# Patient Record
Sex: FEMALE
[De-identification: names, ages, dates, MRNs, and addresses within clinical notes are randomized per-mention and may not be internally consistent; named-entity substitution may affect disease eponyms.]

---

## 2021-03-15 ENCOUNTER — HOSPITAL ENCOUNTER (EMERGENCY)
Dept: HOSPITAL 5 - ED | Age: 56
Discharge: HOME | End: 2021-03-15
Payer: SELF-PAY

## 2021-03-15 VITALS — SYSTOLIC BLOOD PRESSURE: 164 MMHG | DIASTOLIC BLOOD PRESSURE: 90 MMHG

## 2021-03-15 DIAGNOSIS — Z91.048: ICD-10-CM

## 2021-03-15 DIAGNOSIS — Z91.040: ICD-10-CM

## 2021-03-15 DIAGNOSIS — Z79.899: ICD-10-CM

## 2021-03-15 DIAGNOSIS — R10.13: ICD-10-CM

## 2021-03-15 DIAGNOSIS — Z90.49: ICD-10-CM

## 2021-03-15 DIAGNOSIS — R07.9: Primary | ICD-10-CM

## 2021-03-15 DIAGNOSIS — Z98.890: ICD-10-CM

## 2021-03-15 LAB
ALBUMIN SERPL-MCNC: 4.1 G/DL (ref 3.9–5)
ALT SERPL-CCNC: 27 UNITS/L (ref 7–56)
APTT BLD: 29.6 SEC. (ref 24.2–36.6)
BASOPHILS # (AUTO): 0 K/MM3 (ref 0–0.1)
BASOPHILS NFR BLD AUTO: 0.3 % (ref 0–1.8)
BUN SERPL-MCNC: 17 MG/DL (ref 7–17)
BUN/CREAT SERPL: 28 %
CALCIUM SERPL-MCNC: 9.3 MG/DL (ref 8.4–10.2)
EOSINOPHIL # BLD AUTO: 0 K/MM3 (ref 0–0.4)
EOSINOPHIL NFR BLD AUTO: 0.3 % (ref 0–4.3)
HCT VFR BLD CALC: 43.1 % (ref 30.3–42.9)
HEMOLYSIS INDEX: 4
HGB BLD-MCNC: 15.1 GM/DL (ref 10.1–14.3)
INR PPP: 0.97 (ref 0.87–1.13)
LYMPHOCYTES # BLD AUTO: 0.9 K/MM3 (ref 1.2–5.4)
LYMPHOCYTES NFR BLD AUTO: 14.5 % (ref 13.4–35)
MCHC RBC AUTO-ENTMCNC: 35 % (ref 30–34)
MCV RBC AUTO: 89 FL (ref 79–97)
MONOCYTES # (AUTO): 0.3 K/MM3 (ref 0–0.8)
MONOCYTES % (AUTO): 4.1 % (ref 0–7.3)
PLATELET # BLD: 242 K/MM3 (ref 140–440)
RBC # BLD AUTO: 4.86 M/MM3 (ref 3.65–5.03)

## 2021-03-15 PROCEDURE — 83880 ASSAY OF NATRIURETIC PEPTIDE: CPT

## 2021-03-15 PROCEDURE — 80053 COMPREHEN METABOLIC PANEL: CPT

## 2021-03-15 PROCEDURE — C9113 INJ PANTOPRAZOLE SODIUM, VIA: HCPCS

## 2021-03-15 PROCEDURE — 96374 THER/PROPH/DIAG INJ IV PUSH: CPT

## 2021-03-15 PROCEDURE — 85025 COMPLETE CBC W/AUTO DIFF WBC: CPT

## 2021-03-15 PROCEDURE — 96375 TX/PRO/DX INJ NEW DRUG ADDON: CPT

## 2021-03-15 PROCEDURE — 85610 PROTHROMBIN TIME: CPT

## 2021-03-15 PROCEDURE — 93005 ELECTROCARDIOGRAM TRACING: CPT

## 2021-03-15 PROCEDURE — 83690 ASSAY OF LIPASE: CPT

## 2021-03-15 PROCEDURE — 99284 EMERGENCY DEPT VISIT MOD MDM: CPT

## 2021-03-15 PROCEDURE — 71046 X-RAY EXAM CHEST 2 VIEWS: CPT

## 2021-03-15 PROCEDURE — 74177 CT ABD & PELVIS W/CONTRAST: CPT

## 2021-03-15 PROCEDURE — 36415 COLL VENOUS BLD VENIPUNCTURE: CPT

## 2021-03-15 PROCEDURE — 85730 THROMBOPLASTIN TIME PARTIAL: CPT

## 2021-03-15 PROCEDURE — 84484 ASSAY OF TROPONIN QUANT: CPT

## 2021-03-15 NOTE — EVENT NOTE
ED Screening Note


Date of service: 03/15/21


Time: 12:15


ED Screening Note: 


Patient with substernal chest pain radiating to the back.  Onset this morning.  

She reports nausea but no vomiting.  She denies any other symptoms.  She denies 

any heart disease.  She denies any lung disease.  She denies any other 

significant past history.





This initial assessment/diagnostic orders/clinical plan/treatment(s) is/are 

subject to change based on patients health status, clinical progression and 

re-assessment by fellow clinical providers in the ED. Further treatment and 

workup at subsequent clinical providers discretion. Patient/guardian urged not 

to elope from the ED as their condition may be serious if not clinically 

assessed and managed. 





Initial orders include: 


Chest pain order set

## 2021-03-15 NOTE — EMERGENCY DEPARTMENT REPORT
ED Chest Pain HPI





- General


Chief Complaint: Chest Pain


Stated Complaint: CHEST TO BACK PAINS


Time Seen by Provider: 03/15/21 12:13


Source: patient


Mode of arrival: Ambulatory


Limitations: No Limitations





- History of Present Illness


Initial Comments: 





Patient is 55 years old female with no significant past medical history except 

for kidney stone.  Patient presented to the ER complaining of epigastric pain 

with no radiation.  Patient stated that pain started this morning.  Patient 

stated that the pain associated with nausea but no vomiting.  Patient denied any

fever or chills.  No shortness of breath.


MD Complaint: chest pain


-: This morning


Onset: during rest


Pain Location: substernal


Pain Radiation: none


Severity: moderate


Severity scale (0 -10): 5


Quality: sharp


Consistency: intermittent





- Related Data


                                    Allergies











Allergy/AdvReac Type Severity Reaction Status Date / Time


 


Latex, Natural Rubber Allergy  Itching Verified 03/15/21 11:50














Heart Score





- HEART Score


History: Slightly suspicious


EKG: Non-specific


Age: 45-65


Risk factors: 1-2 risk factors


Troponin: < normal limit


HEART Score: 3





- Critical Actions


Critical Actions: 0-3 pts:0.9-1.7%risk of adverse cardiac event.Candidate for 

discharge





ED Review of Systems


ROS: 


Stated complaint: CHEST TO BACK PAINS


Other details as noted in HPI





Comment: All other systems reviewed and negative


Constitutional: denies: chills, fever


Respiratory: denies: cough, shortness of breath, SOB with exertion


Cardiovascular: chest pain.  denies: palpitations, dyspnea on exertion


Gastrointestinal: abdominal pain, nausea.  denies: vomiting, diarrhea, 

constipation, hematemesis, melena, hematochezia


Musculoskeletal: denies: back pain


Neurological: denies: headache, weakness





ED Past Medical Hx





- Past Medical History


Previous Medical History?: No





- Surgical History


Past Surgical History?: Yes


Hx Cholecystectomy: Yes


Additional Surgical History: T&A.  kidney surgery





- Social History


Smoking Status: Never Smoker


Substance Use Type: None





ED Physical Exam





- General


Limitations: No Limitations


General appearance: alert, in no apparent distress





- Head


Head exam: Present: atraumatic, normocephalic, normal inspection





- Eye


Eye exam: Present: normal appearance, PERRL





- ENT


ENT exam: Present: normal exam, normal orophraynx, mucous membranes moist





- Neck


Neck exam: Present: normal inspection, full ROM.  Absent: tenderness, 

meningismus





- Respiratory


Respiratory exam: Present: normal lung sounds bilaterally





- Cardiovascular


Cardiovascular Exam: Present: regular rate, normal rhythm, normal heart sounds





- GI/Abdominal


GI/Abdominal exam: Present: soft, normal bowel sounds.  Absent: distended, 

tenderness, guarding, rebound, rigid, organomegaly, mass, bruit, pulsatile mass





- Extremities Exam


Extremities exam: Present: normal inspection, full ROM, normal capillary refill.

 Absent: tenderness





- Back Exam


Back exam: Present: normal inspection, full ROM.  Absent: CVA tenderness (R), 

CVA tenderness (L)





- Neurological Exam


Neurological exam: Present: alert, oriented X3, CN II-XII intact, normal gait, 

reflexes normal.  Absent: motor sensory deficit





- Psychiatric


Psychiatric exam: Present: normal mood





- Skin


Skin exam: Present: warm, intact, normal color





ED Course


                                   Vital Signs











  03/15/21 03/15/21





  11:50 12:59


 


Temperature 98.0 F 98.2 F


 


Pulse Rate 118 H 111 H


 


Respiratory 18 18





Rate  


 


Blood Pressure 163/105 


 


Blood Pressure  164/90





[Right]  


 


O2 Sat by Pulse 99 100





Oximetry  














ED Medical Decision Making





- Lab Data


Result diagrams: 


                                 03/15/21 12:15





                                 03/15/21 12:15





- EKG Data


-: EKG Interpreted by Me


EKG shows normal: sinus rhythm


Rate: tachycardia





- EKG Data


Interpretation: no acute changes





- Radiology Data


Radiology results: report reviewed





- Medical Decision Making





Patient is 55 years old female with no significant past medical history except 

for kidney stone.  Patient presented to the ER complaining of epigastric pain 

with no radiation.  Patient stated that pain started this morning.  Patient 

stated that the pain associated with nausea but no vomiting.  Patient denied any

 fever or chills.  No shortness of breath.


EKG shows sinus tachycardia.  Patient stated that she is usually anxious.  Labs 

reviewed and is unremarkable.  CT abdomen and pelvis with IV contrast is negati

ve for acute finding.  Troponin is negative x2.  Patient chest pain is atypical 

however patient strongly advised to follow-up with her primary care physician 

for outpatient cardiac work-up.  Patient also advised to return to the ER if she

 develop any new symptoms.


Critical care attestation.: 


If time is entered above; I have spent that time in minutes in the direct care 

of this critically ill patient, excluding procedure time.








ED Disposition


Clinical Impression: 


 Acute chest pain, Acute abdominal pain





Disposition: DC-01 TO HOME OR SELFCARE


Is pt being admited?: No


Condition: Stable


Instructions:  Chest Pain (ED), Nonspecific Chest Pain, Adult, Abdominal Pain, 

Adult


Referrals: 


[facility] [Other] - 3-5 Days

## 2021-03-15 NOTE — CAT SCAN REPORT
CT ABDOMEN AND PELVIS WITH CONTRAST



INDICATION / CLINICAL INFORMATION: Abdominal pain.



TECHNIQUE: Axial CT images were obtained through the abdomen and pelvis after Omnipaque 300, 100 cc I
V contrast.  All CT scans at this location are performed using CT dose reduction for ALARA by means o
f automated exposure control. 



COMPARISON: None available.



FINDINGS:



LOWER CHEST: No significant abnormality.

LIVER: Small low-density lesions.

GALLBLADDER: Surgically absent.  

BILE DUCTS: No significant abnormality.

PANCREAS: No significant abnormality.

SPLEEN: No significant abnormality.

ADRENALS: No significant abnormality.

RIGHT KIDNEY / URETER: 1.2 cm cyst upper pole.

LEFT KIDNEY / URETER: 1.1 cm cyst upper pole. 2 nonobstructing stones measuring no larger than 3 mm.



STOMACH / SMALL BOWEL: No significant abnormality. 

COLON: Scattered noninflamed diverticulosis is seen diffusely. 

APPENDIX: No significant abnormality.  

PERITONEUM: No free fluid. No free air. No fluid collection.

LYMPH NODES: No significant adenopathy.

VASCULAR STRUCTURES: No significant abnormality. 



URINARY BLADDER: No significant abnormality.

REPRODUCTIVE ORGANS: No significant abnormality.



ADDITIONAL FINDINGS: None.



SKELETAL SYSTEM: No significant abnormality.



IMPRESSION:

1. No abdominal inflammatory process.

2. Small low-density liver lesions aren't not well characterized but likely incidental in the absence
 of known malignancy.

3. Small nonobstructing left renal stones.

4. Noninflamed colonic diverticulosis.



Signer Name: Bright Alvarez MD 

Signed: 3/15/2021 2:20 PM

Workstation Name: [id number]

## 2021-03-15 NOTE — XRAY REPORT
CHEST 2 VIEWS 



INDICATION / CLINICAL INFORMATION:

Chest Pain.



COMPARISON: 

None available.



FINDINGS:



SUPPORT DEVICES: None.

HEART / MEDIASTINUM: No significant abnormality. 

LUNGS / PLEURA: No significant pulmonary or pleural abnormality. No pneumothorax. 



ADDITIONAL FINDINGS: No significant additional findings.



IMPRESSION:

1. No acute findings.



Signer Name: Josue Hernandez MD 

Signed: 3/15/2021 12:41 PM

Workstation Name: [email or web address]

## 2021-05-02 ENCOUNTER — HOSPITAL ENCOUNTER (OUTPATIENT)
Dept: HOSPITAL 5 - ED | Age: 56
Setting detail: OBSERVATION
LOS: 1 days | Discharge: HOME | End: 2021-05-03
Attending: INTERNAL MEDICINE | Admitting: INTERNAL MEDICINE
Payer: SELF-PAY

## 2021-05-02 DIAGNOSIS — R94.31: ICD-10-CM

## 2021-05-02 DIAGNOSIS — Z98.890: ICD-10-CM

## 2021-05-02 DIAGNOSIS — I10: ICD-10-CM

## 2021-05-02 DIAGNOSIS — R07.89: Primary | ICD-10-CM

## 2021-05-02 DIAGNOSIS — Z90.49: ICD-10-CM

## 2021-05-02 LAB
ALBUMIN SERPL-MCNC: 3.9 G/DL (ref 3.9–5)
ALT SERPL-CCNC: 21 UNITS/L (ref 7–56)
APTT BLD: 29.2 SEC. (ref 24.2–36.6)
BASOPHILS # (AUTO): 0 K/MM3 (ref 0–0.1)
BASOPHILS NFR BLD AUTO: 0.4 % (ref 0–1.8)
BUN SERPL-MCNC: 15 MG/DL (ref 7–17)
BUN/CREAT SERPL: 25 %
CALCIUM SERPL-MCNC: 9.1 MG/DL (ref 8.4–10.2)
EOSINOPHIL # BLD AUTO: 0 K/MM3 (ref 0–0.4)
EOSINOPHIL NFR BLD AUTO: 0.2 % (ref 0–4.3)
HCT VFR BLD CALC: 44.4 % (ref 30.3–42.9)
HEMOLYSIS INDEX: 27
HGB BLD-MCNC: 15.2 GM/DL (ref 10.1–14.3)
INR PPP: 1 (ref 0.87–1.13)
LYMPHOCYTES # BLD AUTO: 1 K/MM3 (ref 1.2–5.4)
LYMPHOCYTES NFR BLD AUTO: 12.8 % (ref 13.4–35)
MCHC RBC AUTO-ENTMCNC: 34 % (ref 30–34)
MCV RBC AUTO: 91 FL (ref 79–97)
MONOCYTES # (AUTO): 0.4 K/MM3 (ref 0–0.8)
MONOCYTES % (AUTO): 5.5 % (ref 0–7.3)
PLATELET # BLD: 256 K/MM3 (ref 140–440)
RBC # BLD AUTO: 4.88 M/MM3 (ref 3.65–5.03)

## 2021-05-02 PROCEDURE — 36415 COLL VENOUS BLD VENIPUNCTURE: CPT

## 2021-05-02 PROCEDURE — 85379 FIBRIN DEGRADATION QUANT: CPT

## 2021-05-02 PROCEDURE — 85730 THROMBOPLASTIN TIME PARTIAL: CPT

## 2021-05-02 PROCEDURE — 83735 ASSAY OF MAGNESIUM: CPT

## 2021-05-02 PROCEDURE — 96372 THER/PROPH/DIAG INJ SC/IM: CPT

## 2021-05-02 PROCEDURE — 85025 COMPLETE CBC W/AUTO DIFF WBC: CPT

## 2021-05-02 PROCEDURE — G0378 HOSPITAL OBSERVATION PER HR: HCPCS

## 2021-05-02 PROCEDURE — 80061 LIPID PANEL: CPT

## 2021-05-02 PROCEDURE — 80053 COMPREHEN METABOLIC PANEL: CPT

## 2021-05-02 PROCEDURE — 84484 ASSAY OF TROPONIN QUANT: CPT

## 2021-05-02 PROCEDURE — 99285 EMERGENCY DEPT VISIT HI MDM: CPT

## 2021-05-02 PROCEDURE — 80048 BASIC METABOLIC PNL TOTAL CA: CPT

## 2021-05-02 PROCEDURE — 71046 X-RAY EXAM CHEST 2 VIEWS: CPT

## 2021-05-02 PROCEDURE — 85610 PROTHROMBIN TIME: CPT

## 2021-05-02 PROCEDURE — 93306 TTE W/DOPPLER COMPLETE: CPT

## 2021-05-02 PROCEDURE — 93005 ELECTROCARDIOGRAM TRACING: CPT

## 2021-05-02 RX ADMIN — ASPIRIN ONE: 325 TABLET ORAL at 22:35

## 2021-05-02 RX ADMIN — ASPIRIN ONE MG: 325 TABLET ORAL at 22:30

## 2021-05-02 NOTE — XRAY REPORT
CHEST PA AND LATERAL VIEWS



INDICATION: 

chest pain. 



COMPARISON: 

3/15/2021



FINDINGS:



Support devices: None 



Heart: Normal and unchanged 



Lungs/Pleura: No acute pulmonary or pleural findings.  





IMPRESSION:

1. No acute disease and no interval change.



Signer Name: Landry Wagoner MD 

Signed: 5/2/2021 5:53 PM

Workstation Name: [id number]

## 2021-05-02 NOTE — HISTORY AND PHYSICAL REPORT
History of Present Illness


Date of examination: 05/02/21


Date of admission: 


05/02/2021


Chief complaint: 





Chest Pain


History of present illness: 





55-year-old white female with no significant past medical history presenting in 

the emergency room today complaining of chest pain has been ongoing for the past

3 days.  Chest pain has been intermittent and has been going from the right side

of her chest to the left side of her chest.  She has had associated nausea but 

no vomiting.  Denies any headache or dizziness.  Denies any diaphoresis.  She 

denies any cough or shortness of breath.





There is no no relieving or exacerbating factor for chest pain.  There has been 

no radiation.


Patient had a normal stress test in 2017.  No history of cardiac 

catheterization.





Work-up in the emergency room today, chest x-ray, troponin levels were 

unremarkable.  She however has some ST depression in the inferolateral leads.





Patient is being admitted for chest pain evaluation.





Past History


Past Surgical History: cholecystectomy, Other (T&A,Bladder surgery for a 

lesion.)


Social history: no significant social history


Family history: no significant family history





Medications and Allergies


                                    Allergies











Allergy/AdvReac Type Severity Reaction Status Date / Time


 


Latex, Natural Rubber Allergy  Itching Verified 05/02/21 17:08











                                Home Medications











 Medication  Instructions  Recorded  Confirmed  Last Taken  Type


 


Ondansetron [Zofran Odt] 4 mg PO Q8HR PRN #14 tab.rapdis 03/15/21  Unknown Rx


 


Pantoprazole [Protonix] 40 mg PO QDAY #30 tablet 03/15/21  Unknown Rx


 


traMADoL [Ultram 50 MG tab] 50 mg PO Q4HR PRN #14 tablet 03/15/21  Unknown Rx














Review of Systems


Constitutional: no fever, no chills


Ears, nose, mouth and throat: no nasal congestion, no sore throat


Cardiovascular: chest pain, no palpitations, no syncope


Respiratory: no cough, no shortness of breath


Gastrointestinal: no abdominal pain, no nausea, no vomiting, no diarrhea


Genitourinary Female: no pelvic pain, no flank pain, no dysuria, no hematuria


Musculoskeletal: no neck pain, no low back pain


Integumentary: no rash, no pruritis


Neurological: no headaches, no confusion


Psychiatric: no anxiety, no depression


Endocrine: no polyphagia, no polydipsia, no polyuria, no nocturia





Exam





- Constitutional


Vitals: 


                                        











Temp Pulse Resp BP Pulse Ox


 


 99.2 F   113 H  20   118/88   93 


 


 05/02/21 17:43  05/02/21 17:43  05/02/21 17:43  05/02/21 17:43  05/02/21 17:43











General appearance: Present: no acute distress, well-nourished





- EENT


Eyes: Present: PERRL, EOM intact.  Absent: scleral icterus


ENT: hearing intact, clear oral mucosa, dentition normal





- Neck


Neck: Present: supple, normal ROM





- Respiratory


Respiratory effort: normal


Respiratory: bilateral: CTA





- Cardiovascular


Rhythm: regular


Heart Sounds: Present: S1 & S2.  Absent: gallop, systolic murmur, diastolic 

murmur, rub, click





- Extremities


Extremities: no ischemia, pulses intact, pulses symmetrical, No edema, normal 

temperature, normal color, Full ROM


Peripheral Pulses: within normal limits





- Abdominal


General gastrointestinal: Present: soft, non-tender, non-distended, normal bowel

sounds.  Absent: mass





- Integumentary


Integumentary: Present: clear, warm, dry.  Absent: rash





- Musculoskeletal


Musculoskeletal: strength equal bilaterally





- Psychiatric


Psychiatric: appropriate mood/affect, intact judgment & insight, memory intact, 

cooperative





- Neurologic


Neurologic: CNII-XII intact, no focal deficits, moves all extremities





HEART Score





- HEART Score


History: Moderately suspicious


EKG: Significant ST-depression


Age: 45-65


Risk factors: No known risk factors


Troponin: 


                                        











Troponin T  < 0.010 ng/mL (0.00-0.029)   05/02/21  20:21    











Troponin: < normal limit


HEART Score: 4





Results





- Labs


CBC & Chem 7: 


                                 05/02/21 17:56





                                 05/02/21 17:56


Labs: 


                              Abnormal lab results











  05/02/21 Range/Units





  17:56 


 


Hgb  15.2 H  (10.1-14.3)  gm/dl


 


Hct  44.4 H  (30.3-42.9)  %


 


RDW  12.9 L  (13.2-15.2)  %


 


Lymph % (Auto)  12.8 L  (13.4-35.0)  %


 


Lymph # (Auto)  1.0 L  (1.2-5.4)  K/mm3


 


Seg Neutrophils %  81.1 H  (40.0-70.0)  %














Assessment and Plan





- Patient Problems


(1) Chest pain


Current Visit: Yes   Status: Acute   


Plan to address problem: 


Patient admitted and placed on telemetry.  Will monitor cardiac enzymes.


Patient placed on aspirin, sublingual nitroglycerin and IV morphine as needed 

for chest pain.


We will place consult to cardiology for evaluation.








(2) DVT prophylaxis


Current Visit: Yes   Status: Acute   


Plan to address problem: 


Patient placed on subcutaneous heparin.








(3) Full code status


Current Visit: Yes   Status: Acute   


Plan to address problem: 


Patient is full code.

## 2021-05-02 NOTE — EMERGENCY DEPARTMENT REPORT
HPI





- General


Chief Complaint: Chest Pain


Time Seen by Provider: 21 21:19





- HPI


HPI: 





Room 18








The patient is a 55-year-old female present with a chief complaint of chest 

pain.  Patient states for the past 3 days she has had intermittent right-sided 

chest pain described as burning in nature associated with nausea no vomiting.  

Patient denies shortness of breath or diaphoresis.  Patient denies history of 

cough or pleurisy.  Patient denies any recent flights or long car trips.  

Patient denies any breast lesions.  Patient states her last stress test occurred

in  but she is never had a cardiac catheterization





ED Past Medical Hx





- Past Medical History


Previous Medical History?: No





- Surgical History


Hx Cholecystectomy: Yes


Additional Surgical History: T&A.  Bladder lesions removed (benign)





- Family History


Family history: no significant





- Social History


Smoking Status: Never Smoker


Substance Use Type: None (Denies illicit drug use)





- Medications


Home Medications: 


                                Home Medications











 Medication  Instructions  Recorded  Confirmed  Last Taken  Type


 


Ondansetron [Zofran Odt] 4 mg PO Q8HR PRN #14 tab.rapdis 03/15/21  Unknown Rx


 


Pantoprazole [Protonix] 40 mg PO QDAY #30 tablet 03/15/21  Unknown Rx


 


traMADoL [Ultram 50 MG tab] 50 mg PO Q4HR PRN #14 tablet 03/15/21  Unknown Rx














ED Review of Systems


ROS: 


Stated complaint: SHARP RT SIDE BREAST PAIN


Other details as noted in HPI





Constitutional: denies: diaphoresis


Eyes: denies: eye pain


ENT: denies: throat pain


Respiratory: denies: shortness of breath


Cardiovascular: chest pain


Endocrine: no symptoms reported


Gastrointestinal: nausea.  denies: abdominal pain, vomiting


Genitourinary: denies: dysuria


Musculoskeletal: denies: back pain


Skin: denies: lesions


Neurological: denies: headache





Physical Exam





- Physical Exam


Vital Signs: 


                                   Vital Signs











  21





  17:43


 


Temperature 99.2 F


 


Pulse Rate 113 H


 


Respiratory 20





Rate 


 


Blood Pressure 118/88


 


O2 Sat by Pulse 93





Oximetry 











Physical Exam: 





GENERAL: The patient is well-developed well-nourished female lying on stretcher 

not appearing to be in acute distress. []


HEENT: Normocephalic.  Atraumatic.  Extraocular motions are intact.  Patient has

 moist mucous membranes.


NECK: Supple.  Trachea midline


CHEST/LUNGS: Clear to auscultation.  There is no respiratory distress noted.


HEART/CARDIOVASCULAR: Regular.  There is no tachycardia.  There is no gallop rub

 or murmur.


ABDOMEN: Abdomen is soft, nontender.  Patient has normal bowel sounds.  There is

 no abdominal distention.


SKIN: There is no rash.  There is no edema.  There is no diaphoresis.


NEURO: The patient is awake, alert, and oriented.  The patient is cooperative.  

The patient has no focal neurologic deficits.  The patient has normal speech


MUSCULOSKELETAL: There is mild "soreness" per patient to palpation of bilateral 

lower extremity.  There is no evidence of acute injury.





ED Course


                                   Vital Signs











  21





  17:43


 


Temperature 99.2 F


 


Pulse Rate 113 H


 


Respiratory 20





Rate 


 


Blood Pressure 118/88


 


O2 Sat by Pulse 93





Oximetry 














ED Medical Decision Making





- Lab Data


Result diagrams: 


                                 21 17:56





                                 21 17:56





                                Laboratory Tests











  21





  17:56 17:56 17:59


 


WBC  7.9  


 


RBC  4.88  


 


Hgb  15.2 H  


 


Hct  44.4 H  


 


MCV  91  


 


MCH  31  


 


MCHC  34  


 


RDW  12.9 L  


 


Plt Count  256  


 


Lymph % (Auto)  12.8 L  


 


Mono % (Auto)  5.5  


 


Eos % (Auto)  0.2  


 


Baso % (Auto)  0.4  


 


Lymph # (Auto)  1.0 L  


 


Mono # (Auto)  0.4  


 


Eos # (Auto)  0.0  


 


Baso # (Auto)  0.0  


 


Seg Neutrophils %  81.1 H  


 


Seg Neutrophils #  6.4  


 


PT    13.1


 


INR    1.00


 


APTT    29.2


 


D-Dimer   


 


Sodium   138 


 


Potassium   3.8 


 


Chloride   104.7 


 


Carbon Dioxide   23 


 


Anion Gap   14 


 


BUN   15 


 


Creatinine   0.6 


 


Estimated GFR   > 60 


 


BUN/Creatinine Ratio   25 


 


Glucose   94 


 


Calcium   9.1 


 


Magnesium   


 


Total Bilirubin   0.30 


 


AST   17 


 


ALT   21 


 


Alkaline Phosphatase   82 


 


Troponin T   < 0.010 


 


Total Protein   6.5 


 


Albumin   3.9 


 


Albumin/Globulin Ratio   1.5 














  21





  17:59 17:59 20:21


 


WBC   


 


RBC   


 


Hgb   


 


Hct   


 


MCV   


 


MCH   


 


MCHC   


 


RDW   


 


Plt Count   


 


Lymph % (Auto)   


 


Mono % (Auto)   


 


Eos % (Auto)   


 


Baso % (Auto)   


 


Lymph # (Auto)   


 


Mono # (Auto)   


 


Eos # (Auto)   


 


Baso # (Auto)   


 


Seg Neutrophils %   


 


Seg Neutrophils #   


 


PT   


 


INR   


 


APTT   


 


D-Dimer   < 135 


 


Sodium   


 


Potassium   


 


Chloride   


 


Carbon Dioxide   


 


Anion Gap   


 


BUN   


 


Creatinine   


 


Estimated GFR   


 


BUN/Creatinine Ratio   


 


Glucose   


 


Calcium   


 


Magnesium  1.70  


 


Total Bilirubin   


 


AST   


 


ALT   


 


Alkaline Phosphatase   


 


Troponin T    < 0.010


 


Total Protein   


 


Albumin   


 


Albumin/Globulin Ratio   














- EKG Data


-: EKG Interpreted by Me


EKG shows normal: sinus rhythm


Rate: tachycardia





- EKG Data


When compared to previous EKG there are: changes noted


Interpretation: subendocardial ischemia (ST depressions inferolaterally)





- Radiology Data


Radiology results: report reviewed (Chest x-ray), image reviewed (Chest x-ray)


interpreted by me: 





Chest x-ray-no focal infiltrates, no pneumothorax.  No foreign body seen





[facility] [address] [place] [address] 

XRay Report Signed Patient: ANA M MOLINA MR#: [id number] : 1965 

Acct:[id number] Age/Sex: 55 / F ADM Date: 21 Loc: ED Attending Dr: 

Ordering Physician: LOUIE SERRA MD Date of Service: 21 Procedure(s): XR chest 

routine 2V Accession Number(s): [id number] cc: ED MD MARYSE Fluoro Time In Minutes: 

CHEST PA AND LATERAL VIEWS INDICATION: chest pain. COMPARISON: 3/15/2021 

FINDINGS: Support devices: None Heart: Normal and unchanged Lungs/Pleura: No 

acute pulmonary or pleural findings. IMPRESSION: 1. No acute disease and no inte

rval change. Signer Name: Landry Wagoner MD Signed: 2021 5:53 PM Workstation 

Name: [id number] Transcribed By: TM Dictated By: Landry Wagoner MD 

Electronically Authenticated By: Landry Wagoner MD Signed Date/Time: 21 DD/DT: 21 TD/TT: 


Print Cancel 











- Differential Diagnosis


ACS, PE, GERD, pericarditis


Critical care attestation.: 


If time is entered above; I have spent that time in minutes in the direct care 

of this critically ill patient, excluding procedure time.








ED Disposition


Clinical Impression: 


 Chest pain, ST segment depression





Disposition:  OP ADMIT IP TO THIS HOSP


Is pt being admited?: Yes


Does the pt Need Aspirin: Yes


Condition: Fair


Instructions:  Nonspecific Chest Pain, Adult


Referrals: 


PRIMARY CARE,MD [Primary Care Provider] - 3-5 Days


Time of Disposition: 22:14 (Hospitalist paged (Dr Chao))





Heart Score





- HEART Score


History: Moderately suspicious


EKG: Significant ST-depression


Age: 45-65


Risk factors: No known risk factors


Troponin: < normal limit


HEART Score: 4





- EKG Read Time


Time EKG Completed: 17:11


EKG Read Time: 17:15

## 2021-05-02 NOTE — EVENT NOTE
ED Screening Note


Date of service: 05/02/21


Time: 17:53


ED Screening Note: 


55-year-old female patient presents to the emergency department with complaints 

of chest pain starting 2 days ago.  Patient describes the pain as "stabbing."  

Pain began at rest; worse with exertion.  The pain is intermittent, 

nonradiating, and lasts for a matter of seconds when it is present.  Last stress

test was in 2017.  No known history of cardiopulmonary disease.  Patient does 

not use tobacco.  No venous thromboembolism risk factors identified on history.





Tachycardic in triage.


Pulse oximetry 93% on room air.





General: Awake, appropriately interactive, no acute distress. 


Neck: Supple. Full range of motion intact. 


Cardiovascular: Tachycardic.  Normal peripheral perfusion. 


Pulmonary: Clear to auscultation.  No respiratory distress. Patient is speaking 

normally without use of accessory muscles.


Skin: No apparent rashes or lesions. 


Neurological: No facial asymmetry. Speech is clear. Follows commands. Patient is

alert and oriented. 


Musculoskeletal: Moves all four extremities spontaneously with normal range of 

motion. 


Psych: Cooperative. Appropriate mood and affect.





Cardiac work-up initiated; decision to pursue further PE work-up deferred to 

additional ED providers. 





I have greeted and performed a focused rapid initial assessment of this patient.

 A comprehensive ED assessment and evaluation of the patient, analysis of all 

test results, and completion of the medical decision-making process will be 

conducted by additional ED providers. This initial assessment/diagnostic 

orders/clinical plan/treatment(s) is/are subject to change based on patients 

health status, clinical progression and re-assessment. Further treatment and 

workup at subsequent clinical provider's discretion. Patient/guardian urged not 

to elope from the ED as their condition may be serious if not clinically 

assessed and managed.

## 2021-05-03 VITALS — DIASTOLIC BLOOD PRESSURE: 79 MMHG | SYSTOLIC BLOOD PRESSURE: 144 MMHG

## 2021-05-03 LAB
BASOPHILS # (AUTO): 0 K/MM3 (ref 0–0.1)
BASOPHILS NFR BLD AUTO: 0.4 % (ref 0–1.8)
BUN SERPL-MCNC: 14 MG/DL (ref 7–17)
BUN/CREAT SERPL: 35 %
CALCIUM SERPL-MCNC: 8.9 MG/DL (ref 8.4–10.2)
EOSINOPHIL # BLD AUTO: 0 K/MM3 (ref 0–0.4)
EOSINOPHIL NFR BLD AUTO: 0.2 % (ref 0–4.3)
HCT VFR BLD CALC: 43.8 % (ref 30.3–42.9)
HDLC SERPL-MCNC: 58 MG/DL (ref 40–59)
HEMOLYSIS INDEX: 5
HGB BLD-MCNC: 14.6 GM/DL (ref 10.1–14.3)
INR PPP: 1.02 (ref 0.87–1.13)
LYMPHOCYTES # BLD AUTO: 1.6 K/MM3 (ref 1.2–5.4)
LYMPHOCYTES NFR BLD AUTO: 23 % (ref 13.4–35)
MCHC RBC AUTO-ENTMCNC: 33 % (ref 30–34)
MCV RBC AUTO: 91 FL (ref 79–97)
MONOCYTES # (AUTO): 0.4 K/MM3 (ref 0–0.8)
MONOCYTES % (AUTO): 6.2 % (ref 0–7.3)
PLATELET # BLD: 246 K/MM3 (ref 140–440)
RBC # BLD AUTO: 4.82 M/MM3 (ref 3.65–5.03)

## 2021-05-03 NOTE — CONSULTATION
History of Present Illness


Consult date: 05/03/21


Requesting physician: MARCUS AMAYA


History of present illness: 





This patient is a 55-year-old female with no known significant past medical 

history.  She is previously unknown to our practice.  Patient presents to 

[facility] via ER complaining of intermittent chest pain 

x3 days.  Chest pain is described as traveling from the right to left side of 

her chest, nonradiating.  Chest pain is associated with nausea but patient 

denies vomiting.  No relieving or exacerbating factors for her symptoms. 12-lead

reviewed: No ST segment elevation.  Troponins are negative.  AMI ruled out.  

Patient reports normal cardiac stress test in 2017.  She is not currently 

followed by cardiology.  She has never had a heart cath.  Due to risk factors 

patient is scheduled for Lexiscan MPI this a.m.  Patient was unable to complete 

Lexiscan MPI due to claustrophobia.  No further plans for inpatient testing.  

Patient may discharge from cardiology perspective.  May pursue outpatient 

ischemic evaluation at patient's request.





Past History


Past Medical History: other (See HPI)


Past Surgical History: cholecystectomy, Other (T&A,Bladder surgery for a les

ion.)


Social history: no significant social history


Family history: no significant family history





Medications and Allergies


                                    Allergies











Allergy/AdvReac Type Severity Reaction Status Date / Time


 


Latex, Natural Rubber Allergy  Itching Verified 05/02/21 17:08











                                Home Medications











 Medication  Instructions  Recorded  Confirmed  Last Taken  Type


 


Ondansetron [Zofran ODT TAB] 4 mg PO Q8HR PRN #14 tab.rapdis 03/15/21 05/03/21 

05/02/21 Rx


 


Pantoprazole [Protonix TAB] 40 mg PO QDAY #30 tablet 03/15/21 05/03/21 05/02/21 

Rx


 


traMADoL [Ultram 50 MG tab] 50 mg PO Q4HR PRN #14 tablet 03/15/21 05/03/21 

05/03/21 02:03 Rx


 


Aspirin [Adult Aspirin] 81 mg PO DAILY #30 tablet. 05/03/21  Unknown Rx














Review of Systems


Constitutional: no weight loss, no weight gain, no fever, no chills, no sweats


Ears, nose, mouth and throat: no ear pain, no ear discharge, no nasal 

congestion, no nasal discharge, no sinus pressure


Cardiovascular: chest pain, no orthopnea, no palpitations, no rapid/irregular 

heart beat, no edema, no syncope, no lightheadedness


Respiratory: no cough, no hemoptysis, no shortness of breath


Gastrointestinal: nausea, no abdominal pain, no vomiting, no diarrhea


Genitourinary Female: no pelvic pain, no flank pain


Musculoskeletal: no neck stiffness, no neck pain, no shooting arm pain, no arm 

numbness/tingling, no low back pain, no shooting leg pain


Integumentary: no rash, no pruritis, no redness, no sores, no wounds


Psychiatric: no anxiety


Endocrine: no cold intolerance, no heat intolerance


Hematologic/Lymphatic: no easy bruising, no easy bleeding


Allergic/Immunologic: no urticaria





Physical Examination





                                Last Vital Signs











Temp  97.8 F   05/03/21 03:29


 


Pulse  92 H  05/03/21 10:00


 


Resp  14   05/03/21 03:29


 


BP  144/79   05/03/21 03:29


 


Pulse Ox  98   05/03/21 03:29














General appearance: no acute distress


HEENT: Positive: PERRL, Normocephaly, Mucus Membranes Moist


Neck: Positive: neck supple, trachea midline


Cardiac: Positive: Reg Rate and Rhythm, S1/S2


Lungs: Positive: clear to auscultation, Normal Breath Sounds


Neuro: Positive: Grossly Intact


Abdomen: Positive: Unremarkable, Soft


Skin: Negative: Rash, Wound


Musculoskeletal: No Pain


Extremities: Present: upper extr. pulses, lower extr. pulses.  Absent: edema





Results





                                 05/03/21 05:37





                                 05/03/21 05:37


                                 Cardiac Enzymes











  05/02/21 Range/Units





  17:56 


 


AST  17  (5-40)  units/L








                                   Coagulation











  05/02/21 05/03/21 Range/Units





  17:59 05:37 


 


PT  13.1  13.3  (12.2-14.9)  Sec.


 


INR  1.00  1.02  (0.87-1.13)  


 


APTT  29.2   (24.2-36.6)  Sec.








                                     Lipids











  05/03/21 Range/Units





  05:37 


 


Triglycerides  42  (2-149)  mg/dL


 


Cholesterol  151  ()  mg/dL


 


HDL Cholesterol  58  (40-59)  mg/dL


 


Cholesterol/HDL Ratio  2.60  %








                                       CBC











  05/02/21 05/03/21 Range/Units





  17:56 05:37 


 


WBC  7.9  6.9  (4.5-11.0)  K/mm3


 


RBC  4.88  4.82  (3.65-5.03)  M/mm3


 


Hgb  15.2 H  14.6 H  (10.1-14.3)  gm/dl


 


Hct  44.4 H  43.8 H  (30.3-42.9)  %


 


Plt Count  256  246  (140-440)  K/mm3


 


Lymph # (Auto)  1.0 L  1.6  (1.2-5.4)  K/mm3


 


Mono # (Auto)  0.4  0.4  (0.0-0.8)  K/mm3


 


Eos # (Auto)  0.0  0.0  (0.0-0.4)  K/mm3


 


Baso # (Auto)  0.0  0.0  (0.0-0.1)  K/mm3








                          Comprehensive Metabolic Panel











  05/02/21 05/03/21 Range/Units





  17:56 05:37 


 


Sodium  138  143  (137-145)  mmol/L


 


Potassium  3.8  3.7  (3.6-5.0)  mmol/L


 


Chloride  104.7  105.1  ()  mmol/L


 


Carbon Dioxide  23  23  (22-30)  mmol/L


 


BUN  15  14  (7-17)  mg/dL


 


Creatinine  0.6  0.4 L  (0.6-1.2)  mg/dL


 


Glucose  94  94  ()  mg/dL


 


Calcium  9.1  8.9  (8.4-10.2)  mg/dL


 


AST  17   (5-40)  units/L


 


ALT  21   (7-56)  units/L


 


Alkaline Phosphatase  82   ()  units/L


 


Total Protein  6.5   (6.3-8.2)  g/dL


 


Albumin  3.9   (3.9-5)  g/dL














- Imaging and Cardiology


Echo: pending


EKG: report reviewed, image reviewed





EKG interpretations





- Telemetry


EKG Rhythm: Sinus Rhythm





- EKG


Sinus rhythms and dysrhythmias: sinus rhythm





Assessment and Plan





Telemetry reviewed: Sinus tach 103.  No events


Chest pain


   Patient is currently chest pain-free and requesting to be discharged.  

Twelve-lead reviewed no ST segment elevation.  Troponins are negative x2.  Ec

hocardiogram is pending read.  Patient unwilling to undergo stress testing due 

to claustrophobia.  No further plans for ischemic eval at this time.





Hypertension


   Recommend follow-up with PCP





DVT prophylaxis


   SCDs ordered





Patient is currently stable cardiac status.  The patient's request, no further 

plans for ischemic eval in hospital setting.


Recommend patient follow-up with Dr Chaney in our office within 1 to 2 weeks of

discharge.  #[phone]





This patient was seen in conjunction with Dr Connor who agrees with this 

assessment and plan of care








- Patient Problems


(1) Chest pain


Status: Acute   





(2) DVT prophylaxis


Status: Acute   





(3) Hypertension


Status: Chronic

## 2021-05-03 NOTE — DISCHARGE SUMMARY
Providers





- Providers


Date of Admission: 


05/02/21 22:29





Attending physician: 


KARIN ZUNIGA MD





                                        





05/02/21


Consult to Cardiac Rehabilitation [CONS] Routine 


   Reason For Exam: Phase I





05/02/21 23:28


Consult to Cardiology [CONS] Routine 


   Consulting Provider: NADIA TREVINO


   Reason For Exam: Chest pain











Primary care physician: 


PRIMARY CARE MD








Hospitalization


Reason for admission: Chest pain


Condition: Stable


Hospital course: 


55-year-old white female with no significant past medical history presenting in 

the emergency room today complaining of chest pain has been ongoing for the past

 3 days.  Chest pain has been intermittent and has been going from the right 

side of her chest to the left side of her chest.  She has had associated nausea 

but no vomiting.  Denies any headache or dizziness.  Denies any diaphoresis.  

She denies any cough or shortness of breath.





There is no no relieving or exacerbating factor for chest pain.  There has been 

no radiation.


Patient had a normal stress test in 2017.  No history of cardiac 

catheterization.





Work-up in the emergency room today, chest x-ray, troponin levels were 

unremarkable.  She however has some ST depression in the inferolateral leads.





Patient is being admitted for chest pain evaluation.








Patient was evaluated plan for stress test she claims to request a full beacon 

her chest pain has resolved.  At this point she is stable for discharge I 

recommended an outpatient follow-up with cardiology she verbalized 

understanding.





Atypical chest pain likely secondary to costochondritis


Abnormal EKG with ST depression in inferior leads


Anxiety


Disposition: DC-01 TO HOME OR SELFCARE


Final Discharge Diagnosis (Prints w/discharge instructions): Atypical chest pain

 secondary to costochondritis


Time spent for discharge: 35 mins





Core Measure Documentation





- Palliative Care


Palliative Care/ Comfort Measures: Not Applicable





- Core Measures


Any of the following diagnoses?: none





Exam





- Physical Exam


Narrative exam: 


VITAL SIGNS:  Reviewed.    


GENERAL:  The patient appears normally developed, Vital signs as documented.


HEAD:  No signs of head trauma.


EYES:  Pupils are equal.  Extraocular motions intact.  


EARS:  Hearing grossly intact.


MOUTH:  Oropharynx is normal. 


NECK:  No adenopathy, no JVD.  


CHEST:  Chest with clear breath sounds bilaterally.  No wheezes, rales, or 

rhonchi.  


CARDIAC:  Regular rate and rhythm.  S1 and S2, without murmurs, gallops, or 

rubs.


VASCULAR:  No Edema.  Peripheral pulses normal and equal in all extremities.


ABDOMEN:  Soft, non tender and non distended.  No   rebound or guarding, and no 

masses palpated.   Bowel Sounds normal.


MUSCULOSKELETAL:  Good range of motion of all major joints. Extremities without 

clubbing, cyanosis or edema.  


NEUROLOGIC EXAM:  Alert and oriented x 3   No focal sensory or strength 

deficits.   Speech normal.  Follows commands.


PSYCHIATRIC:  Mood normal.


SKIN:  detail exam as documented in skin assessment








- Constitutional


Vitals: 


                                        











Temp Pulse Resp BP Pulse Ox


 


 97.8 F   92 H  14   144/79   98 


 


 05/03/21 03:29  05/03/21 08:59  05/03/21 03:29  05/03/21 03:29  05/03/21 03:29














Plan


Activity: advance as tolerated, fall precautions, other (No strenuous activity 

until evaluated by cardiology outpatient)


Diet: low fat


Special Instructions: record daily weights, record daily BP diary, record blood 

sugar diary


Follow up with: 


CARMEN FAM MD [Staff Physician] - 7 Days


PRIMARY CARE,MD [Primary Care Provider] - 3-5 Days


VITALY HERNANDEZ MD [Staff Physician] - 7 Days


Prescriptions: 


Aspirin [Adult Aspirin] 81 mg PO DAILY #30 tablet.

## 2021-05-04 NOTE — ELECTROCARDIOGRAPH REPORT
[facility]

                                       

Test Date:    2021               Test Time:    17:11:03

Pat Name:     ANA M MOLINA             Department:   

Patient ID:   [id number]          Room:         A477 1

Gender:       F                        Technician:   JOHN

:          1965               Requested By: IRENA CLARK

Order Number: [id number]              Reading MD:   Beatriz Dodd

                                 Measurements

Intervals                              Axis          

Rate:         109                      P:            80

KS:           147                      QRS:          56

QRSD:         75                       T:            61

QT:           324                                    

QTc:          438                                    

                           Interpretive Statements

Sinus tachycardia

Left atrial enlargement

Consider anteroseptal infarct

No previous ECG available for comparison

Electronically Signed On 2021 12:58:21 EDT by Beatriz Dodd

## 2021-05-04 NOTE — ELECTROCARDIOGRAPH REPORT
[facility]

                                       

Test Date:    2021               Test Time:    22:32:34

Pat Name:     ANA M MOLINA             Department:   

Patient ID:   [id number]          Room:         A477 1

Gender:       F                        Technician:   CA

:          1965               Requested By: MARCUS AMAYA

Order Number: [id number]              Reading MD:   Beatriz Dodd

                                 Measurements

Intervals                              Axis          

Rate:         82                       P:            65

RI:           161                      QRS:          0

QRSD:         92                       T:            63

QT:           397                                    

QTc:          464                                    

                           Interpretive Statements

Sinus rhythm

Atrial premature complex

No previous ECG available for comparison

Electronically Signed On 2021 12:59:16 EDT by Beatriz Dodd

## 2021-05-21 ENCOUNTER — WEB ENCOUNTER (OUTPATIENT)
Dept: URBAN - METROPOLITAN AREA CLINIC 109 | Facility: CLINIC | Age: 56
End: 2021-05-21

## 2021-05-21 ENCOUNTER — OFFICE VISIT (OUTPATIENT)
Dept: URBAN - METROPOLITAN AREA CLINIC 109 | Facility: CLINIC | Age: 56
End: 2021-05-21
Payer: SELF-PAY

## 2021-05-21 ENCOUNTER — DASHBOARD ENCOUNTERS (OUTPATIENT)
Age: 56
End: 2021-05-21

## 2021-05-21 VITALS
WEIGHT: 159.6 LBS | BODY MASS INDEX: 28.28 KG/M2 | DIASTOLIC BLOOD PRESSURE: 83 MMHG | HEART RATE: 80 BPM | TEMPERATURE: 97.9 F | SYSTOLIC BLOOD PRESSURE: 129 MMHG | HEIGHT: 63 IN

## 2021-05-21 DIAGNOSIS — R07.89 COSTOCHONDRAL CHEST PAIN: ICD-10-CM

## 2021-05-21 DIAGNOSIS — K76.9 LIVER LESION: ICD-10-CM

## 2021-05-21 DIAGNOSIS — Z12.11 SCREEN FOR COLON CANCER: ICD-10-CM

## 2021-05-21 PROBLEM — 300331000: Status: ACTIVE | Noted: 2021-05-21

## 2021-05-21 PROCEDURE — 99203 OFFICE O/P NEW LOW 30 MIN: CPT | Performed by: INTERNAL MEDICINE

## 2021-05-21 RX ORDER — PANTOPRAZOLE SODIUM 40 MG/1
1 TABLET TABLET, DELAYED RELEASE ORAL ONCE A DAY
Qty: 90 | Refills: 3 | OUTPATIENT
Start: 2021-05-21

## 2021-05-21 NOTE — HPI-TODAY'S VISIT:
having intermittant chest pain for the last few weeks, went to [facility], no MI, normal echo, couldn't complete lexiscan due to claustrophobia intermittant, chest pain, worse when eats, better with nothing, pressure quality, she reports similar to when had her GB out  ct 3/2021  small liver lesions, likely benign

## 2022-01-06 ENCOUNTER — HOSPITAL ENCOUNTER (EMERGENCY)
Dept: HOSPITAL 5 - ED | Age: 57
LOS: 1 days | Discharge: HOME | End: 2022-01-07
Payer: COMMERCIAL

## 2022-01-06 DIAGNOSIS — R04.0: Primary | ICD-10-CM

## 2022-01-06 DIAGNOSIS — Z90.49: ICD-10-CM

## 2022-01-06 PROCEDURE — 99282 EMERGENCY DEPT VISIT SF MDM: CPT

## 2022-01-07 VITALS — SYSTOLIC BLOOD PRESSURE: 130 MMHG | DIASTOLIC BLOOD PRESSURE: 94 MMHG

## 2022-01-07 NOTE — EMERGENCY DEPARTMENT REPORT
HPI





- General


Chief Complaint: Nosebleed


Time Seen by Provider: 01/07/22 00:00





- HPI


HPI: 





56-year-old  female presents to the emergency department with a 

complaint of bleeding from the left nasal passage.  This has been going on 

intermittently over the past 1 to 2 months.  She was seen in urgent care in the 

recent past and "they were able to get it to stop."  It went away for a while 

but started up again this evening.  The patient says that she was holding 

pressure but it continued to bleed.  She says "I think it is coming from my 

sinuses."  She denies any past medical history.  No recent travel or sick 

contacts at home.  No trauma to the nose or face.





ED Past Medical Hx





- Past Medical History


Previous Medical History?: No





- Surgical History


Past Surgical History?: Yes


Hx Cholecystectomy: Yes


Additional Surgical History: T&A.  Bladder lesions removed (benign)





- Social History


Smoking Status: Never Smoker





- Medications


Home Medications: 


                                Home Medications











 Medication  Instructions  Recorded  Confirmed  Last Taken  Type


 


Ondansetron [Zofran ODT TAB] 4 mg PO Q8HR PRN #14 tab.rapdis 03/15/21 05/03/21 

05/02/21 Rx


 


Pantoprazole [Protonix TAB] 40 mg PO QDAY #30 tablet 03/15/21 05/03/21 05/02/21 

Rx


 


traMADoL [Ultram 50 MG tab] 50 mg PO Q4HR PRN #14 tablet 03/15/21 05/03/21 

05/03/21 02:03 Rx


 


Aspirin [Adult Aspirin] 81 mg PO DAILY #30 tablet. 05/03/21  Unknown Rx














ED Review of Systems


ROS: 


Stated complaint: NOSE BLEED


Other details as noted in HPI





Comment: All other systems reviewed and negative


Constitutional: denies: chills, fever


ENT: epistaxis.  denies: throat pain, congestion


Respiratory: denies: cough, shortness of breath


Skin: denies: rash, lesions


Neurological: denies: headache, weakness





Physical Exam





- Physical Exam


Physical Exam: 





GENERAL: The patient is well-developed well-nourished.


HENT: Normocephalic.  Atraumatic.    Patient has moist mucous membranes.  

Oropharynx is clear without tonsillar hypertrophy, erythema or exudates.  No 

bleeding seen in the bilateral nasal passages.


EYES: Extraocular motions are intact.


NECK: Supple. Trachea is midline.


CHEST/LUNGS: Clear to auscultation.  There is no respiratory distress noted.


HEART/CARDIOVASCULAR: Regular.  There is no tachycardia.  There is no murmur.


ABDOMEN: There is no abdominal distention.


SKIN: Skin is warm and dry. 


NEURO: The patient is awake, alert, and oriented.  The patient is cooperative.  

The patient has no focal neurologic deficits.  Normal speech.


MUSCULOSKELETAL: There is no tenderness or deformity.  





ED Medical Decision Making





- Medical Decision Making





This patient presents to the emergency department with complaint of significant 

left-sided epistaxis.  Overall she has been having intermittent epistaxis since 

just after Thanksgiving.  At the time of my examination I do not see any current

bleeding.  The patient had stopped a tissue inside of the left nasal passage and

when she pulled it out there was only scant blood seen on that.  Vital signs 

reassuring including being afebrile.  I do not feel that any laboratory or 

imaging studies are indicated at this time.  She has been given an outpatient 

referral for otolaryngology to follow-up regarding her recurrent epistaxis.  We 

discussed how if she were to start having bleeding again that she should hold 

pressure with her head level for 20 minutes, and if this does not improve the 

bleeding then she will return to the emergency department.


Critical Care Time: No


Critical care attestation.: 


If time is entered above; I have spent that time in minutes in the direct care 

of this critically ill patient, excluding procedure time.








ED Disposition


Clinical Impression: 


 Epistaxis





Disposition: 01 HOME / SELF CARE / HOMELESS


Is pt being admited?: No


Condition: Stable


Instructions:  Nosebleed, Adult


Additional Instructions: 


Please follow-up with a primary care physician in the next few days.





I am giving you a referral for a local ENT, Dr. Dimas, to follow-up regarding 

your recurrent nosebleeds.





If the nose starts to bleed again, hold pressure to both sides of the nose with 

your head level for 20 minutes.  If you continue to have bleeding after this, 

return to the emergency department or an urgent care.





Return to the emergency department with any worsening of your symptoms, new or 

concerning symptoms not addressed during this current emergency department 

visit, or with any acute distress.


Referrals: 


AARON DIMAS MD [Staff Physician] - 3-5 Days


Time of Disposition: 00:35